# Patient Record
Sex: MALE | Race: WHITE | Employment: OTHER | URBAN - METROPOLITAN AREA
[De-identification: names, ages, dates, MRNs, and addresses within clinical notes are randomized per-mention and may not be internally consistent; named-entity substitution may affect disease eponyms.]

---

## 2022-03-18 PROBLEM — Z87.19 H/O RIGHT INGUINAL HERNIA REPAIR: Status: ACTIVE | Noted: 2019-08-19

## 2022-03-18 PROBLEM — K40.90 NON-RECURRENT INGUINAL HERNIA OF LEFT SIDE: Status: ACTIVE | Noted: 2019-08-19

## 2022-03-18 PROBLEM — Z98.890 H/O RIGHT INGUINAL HERNIA REPAIR: Status: ACTIVE | Noted: 2019-08-19

## 2024-02-23 NOTE — PROGRESS NOTES
found.      Subjective:      HPI:  Mr. Lockett is seen in consultation at the request of No ref. provider found for evaluation and recommendations regarding the above diagnoses and the below HPI.    HPI on02/23/24: Patient returns for follow-up and treat regarding chronic low back pain.  He underwent interlaminar NATALIA x 2 with varying degrees of benefit all of which can be measured to about 50% improvement for approaching 3 months.  Last NATALIA with 9/27/2023.  At this point patient is capable of performing majority tasks outside of things that require increased duration of ambulation or standing beyond 10 minutes.  He is otherwise capable of lifting objects rotating in bed.  We will continue to pursue conservative measures.    Initial HPI 6/13/23:  Mr. Lockett is seen in consultation at the request of Boyd Martinez MD for evaluation and recommendations regarding the above diagnoses and the below HPI.  HPI on 06/06/23: 77-year-old  male presents for evaluation treatment of low back pain. Patient reports pains been present for over 4 months without specific inciting event. Describes pain as across his low back bilaterally but more predominantly on his left side. This pain is greatly exacerbated with standing and ambulation and generally alleviated in the seated position. Describes it as sharp stabbing cramping as well as times numbing particularly in his left toe. This limits his ability to care for himself and others as it is exacerbated with activity. He is undergone MRI displays multilevel degenerative disc disease with neuroforaminal stenosis at associated levels. There is also facet arthropathy. After his stroke he was undergoing physical therapy particularly for his low back that did not provide any significant benefit. He has had no injections and has recently evaluated for surgical treatment and was deemed not a surgical candidate at this time. He is taking over-the-counter medication such as

## 2024-03-04 ENCOUNTER — OFFICE VISIT (OUTPATIENT)
Age: 79
End: 2024-03-04
Payer: MEDICARE

## 2024-03-04 DIAGNOSIS — M48.062 LUMBAR STENOSIS WITH NEUROGENIC CLAUDICATION: Primary | ICD-10-CM

## 2024-03-04 PROCEDURE — 99213 OFFICE O/P EST LOW 20 MIN: CPT | Performed by: ANESTHESIOLOGY

## 2024-03-04 PROCEDURE — 1123F ACP DISCUSS/DSCN MKR DOCD: CPT | Performed by: ANESTHESIOLOGY

## 2024-03-04 RX ORDER — POTASSIUM CHLORIDE 750 MG/1
TABLET, FILM COATED, EXTENDED RELEASE ORAL
COMMUNITY
Start: 2024-02-14

## 2024-03-04 RX ORDER — TOBRAMYCIN 3 MG/ML
SOLUTION/ DROPS OPHTHALMIC
COMMUNITY
Start: 2024-02-16

## 2024-03-04 RX ORDER — SILDENAFIL 100 MG/1
50 TABLET, FILM COATED ORAL DAILY PRN
COMMUNITY
Start: 2021-04-28

## 2024-03-04 RX ORDER — ETODOLAC 400 MG/1
400 TABLET, FILM COATED ORAL DAILY
COMMUNITY
Start: 2024-02-14

## 2024-04-09 NOTE — PROGRESS NOTES
Resource Strain: Not on file   Food Insecurity: Not on file   Transportation Needs: Not on file   Physical Activity: Not on file   Stress: Not on file   Social Connections: Not on file   Intimate Partner Violence: Not on file   Housing Stability: Not on file            Allergies   Allergen Reactions    Hydromorphone      Other Reaction(s): Other- (not listed) - Allergy    Decreased blood pressure    Shellfish Allergy      Other Reaction(s): Unknown-Unspecified    Showed on allergy test    Atorvastatin      Other Reaction(s): Joint soreness-Intolerance    Codeine      Other Reaction(s): Wheezing and/or shortness of breathe-Allergy           Outpatient Encounter Medications as of 4/17/2024   Medication Sig Dispense Refill    etodolac (LODINE) 400 MG tablet Take 1 tablet by mouth daily      MAGNESIUM PO Take 1 tablet by mouth daily      potassium chloride (KLOR-CON) 10 MEQ extended release tablet       sildenafil (VIAGRA) 100 MG tablet Take 0.5 tablets by mouth daily as needed      tobramycin (TOBREX) 0.3 % ophthalmic solution INSTILL 1 DROP INTO LEFT EYE EVERY 4 HOURS FOR 5 DAYS      Ubiquinol 100 MG CAPS Take 100 mg by mouth daily      aspirin 81 MG EC tablet Take by mouth daily      celecoxib (CELEBREX) 200 MG capsule TAKE 1 CAPSULE BY MOUTH ONCE DAILY AS NEEDED FOR MODERATE PAIN      omeprazole (PRILOSEC) 20 MG delayed release capsule Take 20 mg by mouth       No facility-administered encounter medications on file as of 4/17/2024.          Review of Systems      All 11 systems reviewed and were negative.            The [place] database for controlled substance prescription monitoring was reviewed.    Date: April 9, 2024  Patient Name: Bob Lockett  MRN:[id number]  PCP: Hoenicke, David I personally performed the HPI, exam, and assessment/plan, verified the documentation and approve it is updated, accurate, and complete. Parts or the entirety of this document were transcribed utilizing voice recognition

## 2024-04-17 ENCOUNTER — OFFICE VISIT (OUTPATIENT)
Age: 79
End: 2024-04-17
Payer: MEDICARE

## 2024-04-17 DIAGNOSIS — M48.062 LUMBAR STENOSIS WITH NEUROGENIC CLAUDICATION: Primary | ICD-10-CM

## 2024-04-17 PROCEDURE — 99213 OFFICE O/P EST LOW 20 MIN: CPT | Performed by: ANESTHESIOLOGY

## 2024-04-17 PROCEDURE — 1123F ACP DISCUSS/DSCN MKR DOCD: CPT | Performed by: ANESTHESIOLOGY

## 2024-05-14 ENCOUNTER — OFFICE VISIT (OUTPATIENT)
Dept: ORTHOPEDIC SURGERY | Age: 79
End: 2024-05-14
Payer: MEDICARE

## 2024-05-14 DIAGNOSIS — M48.062 LUMBAR STENOSIS WITH NEUROGENIC CLAUDICATION: Primary | ICD-10-CM

## 2024-05-14 PROCEDURE — 62323 NJX INTERLAMINAR LMBR/SAC: CPT | Performed by: ANESTHESIOLOGY

## 2024-05-14 RX ORDER — BETAMETHASONE SODIUM PHOSPHATE AND BETAMETHASONE ACETATE 3; 3 MG/ML; MG/ML
30 INJECTION, SUSPENSION INTRA-ARTICULAR; INTRALESIONAL; INTRAMUSCULAR; SOFT TISSUE ONCE
Status: COMPLETED | OUTPATIENT
Start: 2024-05-14 | End: 2024-05-14

## 2024-05-14 RX ADMIN — BETAMETHASONE SODIUM PHOSPHATE AND BETAMETHASONE ACETATE 30 MG: 3; 3 INJECTION, SUSPENSION INTRA-ARTICULAR; INTRALESIONAL; INTRAMUSCULAR; SOFT TISSUE at 08:36

## 2024-05-14 NOTE — PROGRESS NOTES
NAME: Bob Lockett   ID:[id number]   :1945    Location: [facility]    Procedure: Lumbar Epidural Steroid Injection Under Fluoroscopic Imaging    Pre-op Diagnosis: 1. Lumbar Spinal Stenosis . 2. Lumbar Degenerative Disc Disease. 3. Lumbar Radicular Pain    Post-op Diagnosis: Same     Anesthesia: Local only     Complications: None      After confirming written and informed consent and discussing the risk, benefits and alternatives for the procedure, the patient had the correct site marked by the physician performing the procedure. The specific risks of bleeding and infection were discussed. The patient was taken to the fluoroscopy suite. A pulse oximeter was placed, and verbal and visual monitoring were maintained throughout the procedure.    The patient was then placed in the prone position. The skin overlying the lumbo-sacral spine was then prepped with chlorhexidine gluconate and a sterile drape was placed. Appropriate sterile attire was worn, including sterile gloves. A time out was then performed involving the physician, radiation technologist and the patient.    Fluoroscopy was then used to identify the anatomy of the lumbo-sacral spine. The skin overlying the L4/5 interspace was then anesthetized with 3 ml of 1% lidocaine using a 25G 1.5 inch needle. Next, an 20 G 9 cm Tuohy needle was then advanced through the skin, underlying subcutaneous fat and into the supraspinous ligament using intermittent fluoroscopy. After contacting ligament, a contralateral oblique fluoroscopic view of the interspace was obtained to determine the depth of the Tuohy needle. Proper medial-lateral location of the needle was obtained using anterior-posterior fluoroscopic views. A loss of resistance to air technique was then used to traverse the ligamentum flavum into the epidural space. 2 ml of Omnipaque-240 was then used to confirm the location of the needle tip in the epidural space, excluding vascular or intrathecal placement

## 2024-06-19 ENCOUNTER — OFFICE VISIT (OUTPATIENT)
Age: 79
End: 2024-06-19
Payer: MEDICARE

## 2024-06-19 DIAGNOSIS — M48.062 LUMBAR STENOSIS WITH NEUROGENIC CLAUDICATION: Primary | ICD-10-CM

## 2024-06-19 PROCEDURE — 1123F ACP DISCUSS/DSCN MKR DOCD: CPT | Performed by: ANESTHESIOLOGY

## 2024-06-19 PROCEDURE — 99213 OFFICE O/P EST LOW 20 MIN: CPT | Performed by: ANESTHESIOLOGY

## 2024-06-19 NOTE — PROGRESS NOTES
is updated, accurate, and complete. Parts or the entirety of this document were transcribed utilizing voice recognition software. Transcription errors may be present.    Jose Worthy M.D.

## 2024-08-08 ENCOUNTER — OFFICE VISIT (OUTPATIENT)
Dept: ORTHOPEDIC SURGERY | Age: 79
End: 2024-08-08
Payer: MEDICARE

## 2024-08-08 DIAGNOSIS — M48.062 LUMBAR STENOSIS WITH NEUROGENIC CLAUDICATION: Primary | ICD-10-CM

## 2024-08-08 PROCEDURE — 64483 NJX AA&/STRD TFRM EPI L/S 1: CPT | Performed by: ANESTHESIOLOGY

## 2024-08-08 RX ORDER — DEXAMETHASONE SODIUM PHOSPHATE 10 MG/ML
10 INJECTION INTRAMUSCULAR; INTRAVENOUS ONCE
Status: COMPLETED | OUTPATIENT
Start: 2024-08-08 | End: 2024-08-08

## 2024-08-08 RX ADMIN — DEXAMETHASONE SODIUM PHOSPHATE 10 MG: 10 INJECTION INTRAMUSCULAR; INTRAVENOUS at 09:15

## 2024-08-08 NOTE — PROGRESS NOTES
Procedure Date: 2024      Location: [facility]       Procedure: bilateral L4/5 TESI        Time Out performed prior to start of the procedure:       Jose Worthy M.D.  performed the following reviews on Bob Lockett 1945 prior to the start of the procedure:       patient was identified by name and     agreement on procedure being performed was verified   risks and benefits explained to patient by the provider  procedure site verified as Bilateral  patient was positioned for comfort   consent signed and verified for procedure       Time:  3:51 PM        Procedure performed by:   Jose Worthy M.D.       Patient assisted by:   DIAZ GUNTER MA     
dexamethasone 10 mg, 1ml of 1% lidocaine, and 3ml of normal saline was injected. The same medication dose and technique was used to inject the contralateral side.     The needle was withdrawn and Band-Aids were applied. The patient was transported to the recovery room and monitored for an appropriate amount of time, and after meeting discharge criteria, was discharged home with a . No complications were noted through the procedure or recovery period.

## 2024-09-05 ENCOUNTER — OFFICE VISIT (OUTPATIENT)
Age: 79
End: 2024-09-05
Payer: MEDICARE

## 2024-09-05 DIAGNOSIS — M46.1 SACROILIITIS (HCC): Primary | ICD-10-CM

## 2024-09-05 PROCEDURE — 99213 OFFICE O/P EST LOW 20 MIN: CPT | Performed by: ANESTHESIOLOGY

## 2024-09-05 PROCEDURE — 1123F ACP DISCUSS/DSCN MKR DOCD: CPT | Performed by: ANESTHESIOLOGY

## 2024-09-05 NOTE — PROGRESS NOTES
Chronic Pain Consult Note      Plan:     A comprehensive pain management plan may consist of the following: Testing, Therapy, Medications, Interventions, Consults, and Follow up.    Sacroiliitis   -Sched for L SI injection  Lumbar stenosis with neurogenic claudication  -S/p interlaminar L4/5 (left)  -Status post bilateral L4/5 TESI  -Potential series  -Patient could be considered for further surgical consultation pending response to NATALIA  Lumbosacral spondylosis without myelopathy  -We will continue to evaluate for further need of interventional therapy.  Chronic low back pain  -Encourage continuation of active healthy lifestyle     General Recommendations: The pain condition that the patient suffers from is best treated with a multidisciplinary approach that involves an increase in physical activity to prevent de-conditioning and worsening of the pain cycle, as well as psychological counseling (formal and/or informal) to address the co morbid psychological effects of pain. Treatment will often involve judicious use of pain medications and interventional procedures to decrease the pain, allowing the patient to participate in the physical activity that will ultimately produce long-lasting pain reductions. The goal of the multidisciplinary approach is to return the patient to a higher level of overall function and to restore their ability to perform activities of daily living.      Update since previous encounter: Patient returns for evaluation treatment of chronic low back pain.  He is status post bilateral L4 5TESI on 8/8/2024.  Reports 50% improvement of pain and function following injection.  He is able to function operate a level above when he was previously able to complete.  He has less restrictions and limitations.  Denies side effects.  Unfortunately he is experiencing worse lower left buttocks pain.  This pain is present or exacerbated with transitioning putting on socks shoes.  Patient has positive left

## 2024-10-01 NOTE — PROGRESS NOTES
Procedure Date: 2024     Location: [facility]       Procedure: Left SI Joint Injection       Time Out performed prior to start of the procedure:       Jose Worthy M.D.  performed the following reviews on Bob Lockett 1945 prior to the start of the procedure:       patient was identified by name and     agreement on procedure being performed was verified   risks and benefits explained to patient by the provider  procedure site verified as Left  patient was positioned for comfort   consent signed and verified for procedure       Time:  10:45 AM        Procedure performed by:   Jose Worthy M.D.       Patient assisted by:   KATHRYN FRAGA MA

## 2024-10-04 ENCOUNTER — OFFICE VISIT (OUTPATIENT)
Dept: ORTHOPEDIC SURGERY | Age: 79
End: 2024-10-04

## 2024-10-04 DIAGNOSIS — M46.1 SACROILIITIS (HCC): Primary | ICD-10-CM

## 2024-10-04 RX ORDER — METHYLPREDNISOLONE ACETATE 40 MG/ML
40 INJECTION, SUSPENSION INTRA-ARTICULAR; INTRALESIONAL; INTRAMUSCULAR; SOFT TISSUE ONCE
Status: COMPLETED | OUTPATIENT
Start: 2024-10-04 | End: 2024-10-04

## 2024-10-04 RX ADMIN — METHYLPREDNISOLONE ACETATE 40 MG: 40 INJECTION, SUSPENSION INTRA-ARTICULAR; INTRALESIONAL; INTRAMUSCULAR; SOFT TISSUE at 10:33

## 2024-10-04 NOTE — PROGRESS NOTES
NAME: Bob Lockett   ID:[id number]   :1945    Location: [facility]    Procedure: left Sacroiliac Joint Injection Under Fluoroscopic Imaging     Pre-op Diagnosis: Sacroillitis    Post-op Diagnosis: Same     Anesthesia: Local only    Complications: None    After confirming written and informed consent and discussing the risk, benefits and alternatives for the procedure, the patient had the correct site marked by the physician performing the procedure. The specific risks of bleeding and infection were discussed. The patient was taken to the fluoroscopy suite. A pulse oximeter was placed, and verbal and visual monitoring were maintained throughout the procedure.    The patient was then placed in the prone position. The skin overlying the lumbo-sacral spine and the sacroiliac joint was then prepped with chlorhexidine gluconate and a sterile drape was placed. Appropriate sterile attire was worn, including sterile gloves. A time out was then performed involving the physician, radiation technologist and the patient.    Next, the anatomy of the sacroiliac joint was then identified using fluoroscopic guidance. The appropriate sacroiliac joint(s) was identified using cherelle-posterior fluoroscopy. An oblique view was then obtained to line up the anterior and posterior components of the joint. The skin overlying the expected trajectory of the block needle was then anesthetized with 3 ml of 1% lidocaine. Next, depending on patient habitus a 22G or 25G 3 inch Quincke needle was then advanced using a coaxial technique to the inferior/posterior aspect of the joint, just under the posterior superior iliac spine. Upon contacting the joint capsule, negative aspiration was performed to confirm no intravenous access. Next, Depomedrol 40 mg was injected.    The needle was withdrawn and Band-Aids were applied. The patient was transported to the recovery room and monitored for an appropriate amount of time, and after meeting

## 2024-12-05 ENCOUNTER — OFFICE VISIT (OUTPATIENT)
Age: 79
End: 2024-12-05
Payer: MEDICARE

## 2024-12-05 DIAGNOSIS — M48.062 LUMBAR STENOSIS WITH NEUROGENIC CLAUDICATION: Primary | ICD-10-CM

## 2024-12-05 PROCEDURE — G8484 FLU IMMUNIZE NO ADMIN: HCPCS | Performed by: ANESTHESIOLOGY

## 2024-12-05 PROCEDURE — 1159F MED LIST DOCD IN RCRD: CPT | Performed by: ANESTHESIOLOGY

## 2024-12-05 PROCEDURE — G8421 BMI NOT CALCULATED: HCPCS | Performed by: ANESTHESIOLOGY

## 2024-12-05 PROCEDURE — 99213 OFFICE O/P EST LOW 20 MIN: CPT | Performed by: ANESTHESIOLOGY

## 2024-12-05 PROCEDURE — 4004F PT TOBACCO SCREEN RCVD TLK: CPT | Performed by: ANESTHESIOLOGY

## 2024-12-05 PROCEDURE — 1123F ACP DISCUSS/DSCN MKR DOCD: CPT | Performed by: ANESTHESIOLOGY

## 2024-12-05 PROCEDURE — G8427 DOCREV CUR MEDS BY ELIG CLIN: HCPCS | Performed by: ANESTHESIOLOGY

## 2024-12-05 RX ORDER — TRAMADOL HYDROCHLORIDE 50 MG/1
50 TABLET ORAL 2 TIMES DAILY PRN
COMMUNITY
Start: 2024-11-22

## 2024-12-05 NOTE — PROGRESS NOTES
Chronic Pain Consult Note      Plan:     A comprehensive pain management plan may consist of the following: Testing, Therapy, Medications, Interventions, Consults, and Follow up.    Sacroiliitis   -Status post L SI injection  Lumbar stenosis with neurogenic claudication  -S/p interlaminar L4/5 (left)  -Status post bilateral L4/5 TESI  -Potential series  -Referral of your orthopedic spine for further evaluation and consultation regarding surgical treatment  Lumbosacral spondylosis without myelopathy  -We will continue to evaluate for further need of interventional therapy.  Chronic low back pain  -Encourage continuation of active healthy lifestyle     General Recommendations: The pain condition that the patient suffers from is best treated with a multidisciplinary approach that involves an increase in physical activity to prevent de-conditioning and worsening of the pain cycle, as well as psychological counseling (formal and/or informal) to address the co morbid psychological effects of pain. Treatment will often involve judicious use of pain medications and interventional procedures to decrease the pain, allowing the patient to participate in the physical activity that will ultimately produce long-lasting pain reductions. The goal of the multidisciplinary approach is to return the patient to a higher level of overall function and to restore their ability to perform activities of daily living.      Update since previous encounter: Patient returns for evaluation treatment of chronic low back pain.  He is status post left SI reports 20 to 30% improvement of pain and function following injection.  Still has some mild benefit but otherwise is back to baseline.  Patient has pain primarily lower left side rating the lower left extremity.  This pain is only present with standing and ambulating.  Has limited to no pain when sitting or with forward flexion of lumbar spine.  As he has been minimally responsive to injections

## 2024-12-09 ENCOUNTER — OFFICE VISIT (OUTPATIENT)
Age: 79
End: 2024-12-09
Payer: MEDICARE

## 2024-12-09 VITALS — BODY MASS INDEX: 28.93 KG/M2 | HEIGHT: 66 IN | WEIGHT: 180 LBS

## 2024-12-09 DIAGNOSIS — M43.16 SPONDYLOLISTHESIS OF LUMBAR REGION: ICD-10-CM

## 2024-12-09 DIAGNOSIS — M48.062 LUMBAR STENOSIS WITH NEUROGENIC CLAUDICATION: ICD-10-CM

## 2024-12-09 DIAGNOSIS — M54.50 LUMBAR SPINE PAIN: Primary | ICD-10-CM

## 2024-12-09 DIAGNOSIS — M41.9 SCOLIOSIS, UNSPECIFIED SCOLIOSIS TYPE, UNSPECIFIED SPINAL REGION: ICD-10-CM

## 2024-12-09 PROCEDURE — 1159F MED LIST DOCD IN RCRD: CPT | Performed by: ORTHOPAEDIC SURGERY

## 2024-12-09 PROCEDURE — 99204 OFFICE O/P NEW MOD 45 MIN: CPT | Performed by: ORTHOPAEDIC SURGERY

## 2024-12-09 PROCEDURE — G8419 CALC BMI OUT NRM PARAM NOF/U: HCPCS | Performed by: ORTHOPAEDIC SURGERY

## 2024-12-09 PROCEDURE — 1160F RVW MEDS BY RX/DR IN RCRD: CPT | Performed by: ORTHOPAEDIC SURGERY

## 2024-12-09 PROCEDURE — 1123F ACP DISCUSS/DSCN MKR DOCD: CPT | Performed by: ORTHOPAEDIC SURGERY

## 2024-12-09 PROCEDURE — 1036F TOBACCO NON-USER: CPT | Performed by: ORTHOPAEDIC SURGERY

## 2024-12-09 PROCEDURE — G8427 DOCREV CUR MEDS BY ELIG CLIN: HCPCS | Performed by: ORTHOPAEDIC SURGERY

## 2024-12-09 PROCEDURE — G8484 FLU IMMUNIZE NO ADMIN: HCPCS | Performed by: ORTHOPAEDIC SURGERY

## 2024-12-09 NOTE — PROGRESS NOTES
claudication  M48.062       3. Spondylolisthesis of lumbar region  M43.16       4. Scoliosis, unspecified scoliosis type, unspecified spinal region  M41.9           Assessment/Plan:      This patient's clinical history and physical exam is consistent with neurogenic claudication from lumbar stenosis.  His symptoms are most likely related to the spondylolisthesis at L4-L5 and the foraminal stenosis at L5-S1.  He is exhausted traction, and exercise program, and interventional pain management.  At this point, I would need to see the images of the MRI which are in the [facility] system before I could really outline a surgical plan but I would expect it would include at least an L4-S1 laminectomy and fusion.  The patient is going to acquire the actual images on CD and we will also send a request to have them sent over the network and hopefully we will be able to update with a formal surgical plan soon.         Electronically Signed By Ever Quesada MD   12/09/24  9:41 AM